# Patient Record
(demographics unavailable — no encounter records)

---

## 2018-02-18 NOTE — CT
CT ABDOMEN AND PELVIS WITH IV AND ORAL CONTRAST:

 

History: Right lower quadrant pain, vomiting. 

 

FINDINGS: 

Lung bases are clear. The liver, spleen, kidneys, adrenal glands, and pancreas have a normal CT appea
dede. Urinary bladder is unremarkable. 

 

No evidence of bowel obstruction. Appendix is not inflamed. 

 

IMPRESSION: 

No significant abnormalities are demonstrated. 

 

POS: [facility]